# Patient Record
Sex: MALE | Race: AMERICAN INDIAN OR ALASKA NATIVE
[De-identification: names, ages, dates, MRNs, and addresses within clinical notes are randomized per-mention and may not be internally consistent; named-entity substitution may affect disease eponyms.]

---

## 2018-08-07 ENCOUNTER — HOSPITAL ENCOUNTER (EMERGENCY)
Dept: HOSPITAL 5 - ED | Age: 28
Discharge: HOME | End: 2018-08-07
Payer: COMMERCIAL

## 2018-08-07 VITALS — DIASTOLIC BLOOD PRESSURE: 70 MMHG | SYSTOLIC BLOOD PRESSURE: 117 MMHG
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
## 2018-08-07 DIAGNOSIS — S13.4XXA: ICD-10-CM
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
## 2018-08-07 DIAGNOSIS — S39.012A: ICD-10-CM

## 2018-08-07 DIAGNOSIS — F17.200: ICD-10-CM
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
## 2018-08-07 DIAGNOSIS — S46.911A: Primary | ICD-10-CM

## 2018-08-07 PROCEDURE — 72040 X-RAY EXAM NECK SPINE 2-3 VW: CPT

## 2018-08-07 PROCEDURE — 72100 X-RAY EXAM L-S SPINE 2/3 VWS: CPT
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
